# Patient Record
Sex: MALE | Race: WHITE | NOT HISPANIC OR LATINO | URBAN - METROPOLITAN AREA
[De-identification: names, ages, dates, MRNs, and addresses within clinical notes are randomized per-mention and may not be internally consistent; named-entity substitution may affect disease eponyms.]

---

## 2017-06-08 ENCOUNTER — RECORDS - HEALTHEAST (OUTPATIENT)
Dept: LAB | Facility: CLINIC | Age: 82
End: 2017-06-08

## 2017-06-09 LAB — HBA1C MFR BLD: 12.3 % (ref 4.2–6.1)

## 2018-01-11 ENCOUNTER — AMBULATORY - HEALTHEAST (OUTPATIENT)
Dept: GERIATRICS | Facility: CLINIC | Age: 83
End: 2018-01-11

## 2018-01-11 ENCOUNTER — AMBULATORY - HEALTHEAST (OUTPATIENT)
Dept: ADMINISTRATIVE | Facility: CLINIC | Age: 83
End: 2018-01-11

## 2018-01-11 RX ORDER — ATORVASTATIN CALCIUM 40 MG/1
40 TABLET, FILM COATED ORAL AT BEDTIME
Status: SHIPPED | COMMUNITY
Start: 2018-01-11

## 2018-01-11 RX ORDER — PANTOPRAZOLE SODIUM 40 MG/1
40 TABLET, DELAYED RELEASE ORAL 2 TIMES DAILY
Status: SHIPPED | COMMUNITY
Start: 2018-01-11

## 2018-01-11 RX ORDER — SIMETHICONE 80 MG
80 TABLET,CHEWABLE ORAL 3 TIMES DAILY
Status: SHIPPED | COMMUNITY
Start: 2018-01-11

## 2018-01-11 RX ORDER — DEXTROMETHORPHAN HBR. AND GUAIFENESIN 10; 100 MG/5ML; MG/5ML
10 SOLUTION ORAL EVERY 4 HOURS PRN
Status: SHIPPED | COMMUNITY
Start: 2018-01-11

## 2018-01-11 RX ORDER — ACETAMINOPHEN 500 MG
1000 TABLET ORAL EVERY 6 HOURS PRN
Status: SHIPPED | COMMUNITY
Start: 2018-01-11

## 2018-01-11 RX ORDER — LISINOPRIL 2.5 MG/1
2.5 TABLET ORAL DAILY
Status: SHIPPED | COMMUNITY
Start: 2018-01-11

## 2018-01-11 RX ORDER — NITROGLYCERIN 0.4 MG/1
0.4 TABLET SUBLINGUAL EVERY 5 MIN PRN
Status: SHIPPED | COMMUNITY
Start: 2018-01-11

## 2018-01-11 RX ORDER — ASPIRIN 325 MG
325 TABLET ORAL DAILY
Status: SHIPPED | COMMUNITY
Start: 2018-01-11

## 2018-01-11 RX ORDER — ISOSORBIDE MONONITRATE 30 MG/1
45 TABLET, EXTENDED RELEASE ORAL DAILY
Status: SHIPPED | COMMUNITY
Start: 2018-01-11

## 2018-01-11 RX ORDER — POTASSIUM CHLORIDE 1500 MG/1
20 TABLET, EXTENDED RELEASE ORAL DAILY
Status: SHIPPED | COMMUNITY
Start: 2018-01-11

## 2018-01-11 RX ORDER — BUMETANIDE 2 MG/1
2 TABLET ORAL
Status: SHIPPED | COMMUNITY
Start: 2018-01-11

## 2018-01-11 RX ORDER — LANOLIN ALCOHOL/MO/W.PET/CERES
3 CREAM (GRAM) TOPICAL
Status: SHIPPED | COMMUNITY
Start: 2018-01-11

## 2018-01-15 ENCOUNTER — OFFICE VISIT - HEALTHEAST (OUTPATIENT)
Dept: GERIATRICS | Facility: CLINIC | Age: 83
End: 2018-01-15

## 2018-01-15 DIAGNOSIS — E11.9 DIABETES MELLITUS (H): ICD-10-CM

## 2018-01-15 DIAGNOSIS — F03.90 DEMENTIA (H): ICD-10-CM

## 2018-01-15 DIAGNOSIS — J96.00 ACUTE RESPIRATORY FAILURE (H): ICD-10-CM

## 2018-01-15 DIAGNOSIS — J10.1 INFLUENZA A: ICD-10-CM

## 2018-01-15 DIAGNOSIS — I25.10 CORONARY ARTERY DISEASE: ICD-10-CM

## 2018-01-16 ENCOUNTER — OFFICE VISIT - HEALTHEAST (OUTPATIENT)
Dept: GERIATRICS | Facility: CLINIC | Age: 83
End: 2018-01-16

## 2018-01-16 ENCOUNTER — RECORDS - HEALTHEAST (OUTPATIENT)
Dept: LAB | Facility: CLINIC | Age: 83
End: 2018-01-16

## 2018-01-16 DIAGNOSIS — J96.00 ACUTE RESPIRATORY FAILURE (H): ICD-10-CM

## 2018-01-16 DIAGNOSIS — J44.9 COPD (CHRONIC OBSTRUCTIVE PULMONARY DISEASE) (H): ICD-10-CM

## 2018-01-16 DIAGNOSIS — E11.9 DIABETES MELLITUS (H): ICD-10-CM

## 2018-01-16 DIAGNOSIS — F03.90 DEMENTIA (H): ICD-10-CM

## 2018-01-16 DIAGNOSIS — E87.70 FLUID OVERLOAD: ICD-10-CM

## 2018-01-16 DIAGNOSIS — I50.9 CHF (CONGESTIVE HEART FAILURE) (H): ICD-10-CM

## 2018-01-16 DIAGNOSIS — J10.1 INFLUENZA A: ICD-10-CM

## 2018-01-16 DIAGNOSIS — I25.10 CORONARY ARTERY DISEASE: ICD-10-CM

## 2018-01-16 DIAGNOSIS — S81.811A LEG LACERATION, RIGHT, INITIAL ENCOUNTER: ICD-10-CM

## 2018-01-16 LAB
ANION GAP SERPL CALCULATED.3IONS-SCNC: 7 MMOL/L (ref 5–18)
BNP SERPL-MCNC: 114 PG/ML (ref 0–93)
BUN SERPL-MCNC: 18 MG/DL (ref 8–28)
CALCIUM SERPL-MCNC: 10 MG/DL (ref 8.5–10.5)
CHLORIDE BLD-SCNC: 102 MMOL/L (ref 98–107)
CO2 SERPL-SCNC: 30 MMOL/L (ref 22–31)
CREAT SERPL-MCNC: 1.4 MG/DL (ref 0.7–1.3)
ERYTHROCYTE [DISTWIDTH] IN BLOOD BY AUTOMATED COUNT: 14.5 % (ref 11–14.5)
GFR SERPL CREATININE-BSD FRML MDRD: 48 ML/MIN/1.73M2
GLUCOSE BLD-MCNC: 272 MG/DL (ref 70–125)
HCT VFR BLD AUTO: 33.3 % (ref 40–54)
HGB BLD-MCNC: 10.5 G/DL (ref 14–18)
MCH RBC QN AUTO: 30.2 PG (ref 27–34)
MCHC RBC AUTO-ENTMCNC: 31.5 G/DL (ref 32–36)
MCV RBC AUTO: 96 FL (ref 80–100)
PLATELET # BLD AUTO: 197 THOU/UL (ref 140–440)
PMV BLD AUTO: 10.3 FL (ref 8.5–12.5)
POTASSIUM BLD-SCNC: 4 MMOL/L (ref 3.5–5)
RBC # BLD AUTO: 3.48 MILL/UL (ref 4.4–6.2)
SODIUM SERPL-SCNC: 139 MMOL/L (ref 136–145)
WBC: 6.7 THOU/UL (ref 4–11)

## 2018-01-18 ENCOUNTER — RECORDS - HEALTHEAST (OUTPATIENT)
Dept: LAB | Facility: CLINIC | Age: 83
End: 2018-01-18

## 2018-01-18 ENCOUNTER — OFFICE VISIT - HEALTHEAST (OUTPATIENT)
Dept: GERIATRICS | Facility: CLINIC | Age: 83
End: 2018-01-18

## 2018-01-18 DIAGNOSIS — S81.811A LEG LACERATION, RIGHT, INITIAL ENCOUNTER: ICD-10-CM

## 2018-01-18 DIAGNOSIS — F03.90 DEMENTIA (H): ICD-10-CM

## 2018-01-18 DIAGNOSIS — E87.70 FLUID OVERLOAD: ICD-10-CM

## 2018-01-18 DIAGNOSIS — J10.1 INFLUENZA A: ICD-10-CM

## 2018-01-18 DIAGNOSIS — J44.9 COPD (CHRONIC OBSTRUCTIVE PULMONARY DISEASE) (H): ICD-10-CM

## 2018-01-18 LAB
ALBUMIN UR-MCNC: NEGATIVE MG/DL
ANION GAP SERPL CALCULATED.3IONS-SCNC: 10 MMOL/L (ref 5–18)
APPEARANCE UR: CLEAR
BACTERIA #/AREA URNS HPF: ABNORMAL HPF
BILIRUB UR QL STRIP: NEGATIVE
BNP SERPL-MCNC: 125 PG/ML (ref 0–93)
BUN SERPL-MCNC: 17 MG/DL (ref 8–28)
CALCIUM SERPL-MCNC: 10 MG/DL (ref 8.5–10.5)
CHLORIDE BLD-SCNC: 103 MMOL/L (ref 98–107)
CO2 SERPL-SCNC: 30 MMOL/L (ref 22–31)
COLOR UR AUTO: YELLOW
CREAT SERPL-MCNC: 1.28 MG/DL (ref 0.7–1.3)
ERYTHROCYTE [DISTWIDTH] IN BLOOD BY AUTOMATED COUNT: 14.3 % (ref 11–14.5)
GFR SERPL CREATININE-BSD FRML MDRD: 53 ML/MIN/1.73M2
GLUCOSE BLD-MCNC: 127 MG/DL (ref 70–125)
GLUCOSE UR STRIP-MCNC: NEGATIVE MG/DL
HCT VFR BLD AUTO: 33.3 % (ref 40–54)
HGB BLD-MCNC: 11.1 G/DL (ref 14–18)
HGB UR QL STRIP: NEGATIVE
HYALINE CASTS #/AREA URNS LPF: ABNORMAL LPF
KETONES UR STRIP-MCNC: NEGATIVE MG/DL
LEUKOCYTE ESTERASE UR QL STRIP: ABNORMAL
MCH RBC QN AUTO: 31.5 PG (ref 27–34)
MCHC RBC AUTO-ENTMCNC: 33.3 G/DL (ref 32–36)
MCV RBC AUTO: 95 FL (ref 80–100)
NITRATE UR QL: NEGATIVE
PH UR STRIP: 6.5 [PH] (ref 4.5–8)
PLATELET # BLD AUTO: 169 THOU/UL (ref 140–440)
PMV BLD AUTO: 10.2 FL (ref 8.5–12.5)
POTASSIUM BLD-SCNC: 3.6 MMOL/L (ref 3.5–5)
RBC # BLD AUTO: 3.52 MILL/UL (ref 4.4–6.2)
RBC #/AREA URNS AUTO: ABNORMAL HPF
SODIUM SERPL-SCNC: 143 MMOL/L (ref 136–145)
SP GR UR STRIP: 1.01 (ref 1–1.03)
SQUAMOUS #/AREA URNS AUTO: ABNORMAL LPF
UROBILINOGEN UR STRIP-ACNC: ABNORMAL
WBC #/AREA URNS AUTO: ABNORMAL HPF
WBC: 6.4 THOU/UL (ref 4–11)

## 2018-01-19 LAB — BACTERIA SPEC CULT: NO GROWTH

## 2018-01-23 ENCOUNTER — OFFICE VISIT - HEALTHEAST (OUTPATIENT)
Dept: GERIATRICS | Facility: CLINIC | Age: 83
End: 2018-01-23

## 2018-01-23 DIAGNOSIS — J10.1 INFLUENZA A: ICD-10-CM

## 2018-01-23 DIAGNOSIS — J44.9 COPD (CHRONIC OBSTRUCTIVE PULMONARY DISEASE) (H): ICD-10-CM

## 2018-01-23 DIAGNOSIS — I50.9 CHF (CONGESTIVE HEART FAILURE) (H): ICD-10-CM

## 2018-01-23 DIAGNOSIS — F03.90 DEMENTIA (H): ICD-10-CM

## 2018-01-23 DIAGNOSIS — E87.70 FLUID OVERLOAD: ICD-10-CM

## 2018-01-23 DIAGNOSIS — S81.811A LEG LACERATION, RIGHT, INITIAL ENCOUNTER: ICD-10-CM

## 2018-01-23 DIAGNOSIS — E11.9 DIABETES MELLITUS (H): ICD-10-CM

## 2018-01-24 ENCOUNTER — RECORDS - HEALTHEAST (OUTPATIENT)
Dept: LAB | Facility: CLINIC | Age: 83
End: 2018-01-24

## 2018-01-25 ENCOUNTER — RECORDS - HEALTHEAST (OUTPATIENT)
Dept: LAB | Facility: CLINIC | Age: 83
End: 2018-01-25

## 2018-01-25 ENCOUNTER — OFFICE VISIT - HEALTHEAST (OUTPATIENT)
Dept: GERIATRICS | Facility: CLINIC | Age: 83
End: 2018-01-25

## 2018-01-25 DIAGNOSIS — E11.9 DIABETES MELLITUS (H): ICD-10-CM

## 2018-01-25 DIAGNOSIS — I25.10 CORONARY ARTERY DISEASE: ICD-10-CM

## 2018-01-25 DIAGNOSIS — J44.9 COPD (CHRONIC OBSTRUCTIVE PULMONARY DISEASE) (H): ICD-10-CM

## 2018-01-25 DIAGNOSIS — F03.90 DEMENTIA (H): ICD-10-CM

## 2018-01-25 DIAGNOSIS — I50.9 CHF (CONGESTIVE HEART FAILURE) (H): ICD-10-CM

## 2018-01-25 DIAGNOSIS — J10.1 INFLUENZA A: ICD-10-CM

## 2018-01-25 DIAGNOSIS — I48.91 ATRIAL FIBRILLATION (H): ICD-10-CM

## 2018-01-25 LAB
ANION GAP SERPL CALCULATED.3IONS-SCNC: 6 MMOL/L (ref 5–18)
BNP SERPL-MCNC: 108 PG/ML (ref 0–93)
BUN SERPL-MCNC: 12 MG/DL (ref 8–28)
CALCIUM SERPL-MCNC: 10.2 MG/DL (ref 8.5–10.5)
CHLORIDE BLD-SCNC: 102 MMOL/L (ref 98–107)
CO2 SERPL-SCNC: 36 MMOL/L (ref 22–31)
CREAT SERPL-MCNC: 1.08 MG/DL (ref 0.7–1.3)
FLUAV AG SPEC QL IA: NORMAL
FLUBV AG SPEC QL IA: NORMAL
GFR SERPL CREATININE-BSD FRML MDRD: >60 ML/MIN/1.73M2
GLUCOSE BLD-MCNC: 125 MG/DL (ref 70–125)
POTASSIUM BLD-SCNC: 3.2 MMOL/L (ref 3.5–5)
SODIUM SERPL-SCNC: 144 MMOL/L (ref 136–145)

## 2018-01-30 ENCOUNTER — RECORDS - HEALTHEAST (OUTPATIENT)
Dept: LAB | Facility: CLINIC | Age: 83
End: 2018-01-30

## 2018-01-30 ENCOUNTER — OFFICE VISIT - HEALTHEAST (OUTPATIENT)
Dept: GERIATRICS | Facility: CLINIC | Age: 83
End: 2018-01-30

## 2018-01-30 DIAGNOSIS — J44.9 COPD (CHRONIC OBSTRUCTIVE PULMONARY DISEASE) (H): ICD-10-CM

## 2018-01-30 DIAGNOSIS — I50.9 CHF (CONGESTIVE HEART FAILURE) (H): ICD-10-CM

## 2018-01-30 DIAGNOSIS — I48.91 ATRIAL FIBRILLATION (H): ICD-10-CM

## 2018-01-30 DIAGNOSIS — F03.90 DEMENTIA (H): ICD-10-CM

## 2018-01-30 DIAGNOSIS — J10.1 INFLUENZA A: ICD-10-CM

## 2018-01-30 DIAGNOSIS — R53.83 FATIGUE: ICD-10-CM

## 2018-01-30 DIAGNOSIS — I25.10 CORONARY ARTERY DISEASE: ICD-10-CM

## 2018-01-30 DIAGNOSIS — E11.9 DIABETES MELLITUS (H): ICD-10-CM

## 2018-01-30 LAB
ANION GAP SERPL CALCULATED.3IONS-SCNC: 8 MMOL/L (ref 5–18)
BUN SERPL-MCNC: 16 MG/DL (ref 8–28)
CALCIUM SERPL-MCNC: 10.2 MG/DL (ref 8.5–10.5)
CHLORIDE BLD-SCNC: 101 MMOL/L (ref 98–107)
CO2 SERPL-SCNC: 32 MMOL/L (ref 22–31)
CREAT SERPL-MCNC: 1.03 MG/DL (ref 0.7–1.3)
ERYTHROCYTE [DISTWIDTH] IN BLOOD BY AUTOMATED COUNT: 14.8 % (ref 11–14.5)
GFR SERPL CREATININE-BSD FRML MDRD: >60 ML/MIN/1.73M2
GLUCOSE BLD-MCNC: 170 MG/DL (ref 70–125)
HCT VFR BLD AUTO: 35.4 % (ref 40–54)
HGB BLD-MCNC: 11.6 G/DL (ref 14–18)
MCH RBC QN AUTO: 30.7 PG (ref 27–34)
MCHC RBC AUTO-ENTMCNC: 32.8 G/DL (ref 32–36)
MCV RBC AUTO: 94 FL (ref 80–100)
PLATELET # BLD AUTO: 133 THOU/UL (ref 140–440)
PMV BLD AUTO: 10 FL (ref 8.5–12.5)
POTASSIUM BLD-SCNC: 3.4 MMOL/L (ref 3.5–5)
RBC # BLD AUTO: 3.78 MILL/UL (ref 4.4–6.2)
SODIUM SERPL-SCNC: 141 MMOL/L (ref 136–145)
WBC: 5.3 THOU/UL (ref 4–11)

## 2018-02-01 ENCOUNTER — OFFICE VISIT - HEALTHEAST (OUTPATIENT)
Dept: GERIATRICS | Facility: CLINIC | Age: 83
End: 2018-02-01

## 2018-02-01 DIAGNOSIS — E11.9 DIABETES MELLITUS (H): ICD-10-CM

## 2018-02-01 DIAGNOSIS — F03.918 DEMENTIA WITH BEHAVIORAL DISTURBANCE (H): ICD-10-CM

## 2018-02-01 DIAGNOSIS — I50.9 CHF (CONGESTIVE HEART FAILURE) (H): ICD-10-CM

## 2018-02-01 DIAGNOSIS — J44.9 COPD (CHRONIC OBSTRUCTIVE PULMONARY DISEASE) (H): ICD-10-CM

## 2018-02-01 DIAGNOSIS — J10.1 INFLUENZA A: ICD-10-CM

## 2018-02-01 DIAGNOSIS — R53.83 FATIGUE: ICD-10-CM

## 2018-02-01 DIAGNOSIS — I25.10 CORONARY ARTERY DISEASE: ICD-10-CM

## 2018-02-01 RX ORDER — FINASTERIDE 5 MG/1
5 TABLET, FILM COATED ORAL AT BEDTIME
Status: SHIPPED | COMMUNITY
Start: 2018-02-01

## 2018-02-01 RX ORDER — IPRATROPIUM BROMIDE AND ALBUTEROL SULFATE 2.5; .5 MG/3ML; MG/3ML
3 SOLUTION RESPIRATORY (INHALATION) 4 TIMES DAILY
Status: SHIPPED | COMMUNITY
Start: 2018-02-01

## 2018-02-01 RX ORDER — IPRATROPIUM BROMIDE AND ALBUTEROL SULFATE 2.5; .5 MG/3ML; MG/3ML
3 SOLUTION RESPIRATORY (INHALATION) EVERY 4 HOURS PRN
Status: SHIPPED | COMMUNITY
Start: 2018-02-01

## 2018-02-05 ENCOUNTER — AMBULATORY - HEALTHEAST (OUTPATIENT)
Dept: GERIATRICS | Facility: CLINIC | Age: 83
End: 2018-02-05

## 2018-02-09 ENCOUNTER — RECORDS - HEALTHEAST (OUTPATIENT)
Dept: LAB | Facility: CLINIC | Age: 83
End: 2018-02-09

## 2018-02-12 LAB
ALBUMIN SERPL-MCNC: 3 G/DL (ref 3.5–5)
ALP SERPL-CCNC: 53 U/L (ref 45–120)
ALT SERPL W P-5'-P-CCNC: 12 U/L (ref 0–45)
ANION GAP SERPL CALCULATED.3IONS-SCNC: 5 MMOL/L (ref 5–18)
AST SERPL W P-5'-P-CCNC: 12 U/L (ref 0–40)
BASOPHILS # BLD AUTO: 0 THOU/UL (ref 0–0.2)
BASOPHILS NFR BLD AUTO: 1 % (ref 0–2)
BILIRUB DIRECT SERPL-MCNC: 0.2 MG/DL
BILIRUB SERPL-MCNC: 0.7 MG/DL (ref 0–1)
BUN SERPL-MCNC: 10 MG/DL (ref 8–28)
CALCIUM SERPL-MCNC: 10.1 MG/DL (ref 8.5–10.5)
CHLORIDE BLD-SCNC: 100 MMOL/L (ref 98–107)
CHOLEST SERPL-MCNC: 114 MG/DL
CO2 SERPL-SCNC: 33 MMOL/L (ref 22–31)
CREAT SERPL-MCNC: 1.13 MG/DL (ref 0.7–1.3)
EOSINOPHIL # BLD AUTO: 0.1 THOU/UL (ref 0–0.4)
EOSINOPHIL NFR BLD AUTO: 2 % (ref 0–6)
ERYTHROCYTE [DISTWIDTH] IN BLOOD BY AUTOMATED COUNT: 13.6 % (ref 11–14.5)
FASTING STATUS PATIENT QL REPORTED: ABNORMAL
GFR SERPL CREATININE-BSD FRML MDRD: >60 ML/MIN/1.73M2
GLUCOSE BLD-MCNC: 262 MG/DL (ref 70–125)
HCT VFR BLD AUTO: 32.7 % (ref 40–54)
HDLC SERPL-MCNC: 27 MG/DL
HGB BLD-MCNC: 11.1 G/DL (ref 14–18)
LDLC SERPL CALC-MCNC: 37 MG/DL
LYMPHOCYTES # BLD AUTO: 0.8 THOU/UL (ref 0.8–4.4)
LYMPHOCYTES NFR BLD AUTO: 16 % (ref 20–40)
MCH RBC QN AUTO: 31.4 PG (ref 27–34)
MCHC RBC AUTO-ENTMCNC: 33.9 G/DL (ref 32–36)
MCV RBC AUTO: 93 FL (ref 80–100)
MONOCYTES # BLD AUTO: 0.4 THOU/UL (ref 0–0.9)
MONOCYTES NFR BLD AUTO: 7 % (ref 2–10)
NEUTROPHILS # BLD AUTO: 3.9 THOU/UL (ref 2–7.7)
NEUTROPHILS NFR BLD AUTO: 74 % (ref 50–70)
PLATELET # BLD AUTO: 150 THOU/UL (ref 140–440)
PMV BLD AUTO: 10.3 FL (ref 8.5–12.5)
POTASSIUM BLD-SCNC: 3.3 MMOL/L (ref 3.5–5)
PROT SERPL-MCNC: 6.2 G/DL (ref 6–8)
RBC # BLD AUTO: 3.53 MILL/UL (ref 4.4–6.2)
SODIUM SERPL-SCNC: 138 MMOL/L (ref 136–145)
TRIGL SERPL-MCNC: 252 MG/DL
TSH SERPL DL<=0.005 MIU/L-ACNC: 2.49 UIU/ML (ref 0.3–5)
WBC: 5.3 THOU/UL (ref 4–11)

## 2018-02-13 LAB — HBA1C MFR BLD: 7.2 % (ref 4.2–6.1)

## 2018-02-17 ENCOUNTER — RECORDS - HEALTHEAST (OUTPATIENT)
Dept: LAB | Facility: CLINIC | Age: 83
End: 2018-02-17

## 2018-02-19 LAB — POTASSIUM BLD-SCNC: 3.7 MMOL/L (ref 3.5–5)

## 2021-05-31 VITALS — WEIGHT: 226 LBS

## 2021-05-31 VITALS — WEIGHT: 215.4 LBS

## 2021-05-31 VITALS — WEIGHT: 231.4 LBS

## 2021-05-31 VITALS — WEIGHT: 206 LBS

## 2021-05-31 VITALS — WEIGHT: 230.6 LBS

## 2021-06-15 NOTE — PROGRESS NOTES
Code Status:  POLST AVAILABLE  Visit Type: Problem Visit     Facility:  [facility] [[id number]]         Facility Type: SNF (Skilled Nursing Facility, TCU)    History of Present Illness: Gadiel Woods is a 86 y.o. male who I am seeing today for follow-up on the TCU.  Patient recently admitted with influenza A.  He has completed his Tamiflu.  During hospitalization he had acute respiratory failure with possible aspiration pneumonia.  He was placed on BiPAP and was treated additionally with albuterol, antibiotics and steroids.  A video swallow did reveal evidence of esophageal dysmotility as well as chronic aspiration risk.  He does have underlying dementia.  Diabetes mellitus type 2.  He does continue on insulin.  May need additional adjustments to his insulin.  Denies any shortness of breath on visit today.  However he does continue with increased lower extremity edema as well as crackles with audible rub.  He has 2-3+ lower extremity edema he has underlying congestive heart failure.  Continues on Bumex.  Will only have 2 weights on him since he has been at the TCU.  Sats are on the low side at 90%.  He does have underlying COPD.  He continues in therapy.      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      Asthma      CAD (coronary artery disease)      CHF (congestive heart failure)      Chronic respiratory failure with hypoxia      COPD (chronic obstructive pulmonary disease)      Coronary atherosclerosis      Dementia      Essential hypertension      HLD (hyperlipidemia)      Hypertensive encephalopathy      Left leg DVT 11/05/2014     Morbid obesity      NSTEMI (non-ST elevated myocardial infarction) 08/13/2017     Pulmonary embolism on right 11/05/2014     Sleep  apnea      Tobacco use disorder      Type 2 diabetes mellitus        Current Outpatient Prescriptions   Medication Sig     acetaminophen (TYLENOL) 500 MG tablet Take 1,000 mg by mouth every 6 (six) hours as needed for pain.     albuterol (PROVENTIL) 2.5 mg /3 mL (0.083 %) nebulizer solution Take 2.5 mg by nebulization 4 (four) times a day.     albuterol (PROVENTIL) 2.5 mg /3 mL (0.083 %) nebulizer solution Take 2.5 mg by nebulization every 4 (four) hours as needed for wheezing.     aspirin 325 MG tablet Take 325 mg by mouth daily.     atorvastatin (LIPITOR) 40 MG tablet Take 40 mg by mouth at bedtime.     bumetanide (BUMEX) 2 MG tablet Take 2 mg by mouth daily before breakfast.     cholecalciferol, vitamin D3, 1,000 unit tablet Take 1,000 Units by mouth daily.     dextromethorphan-guaiFENesin (ROBITUSSIN-DM)  mg/5 mL liquid Take 10 mL by mouth every 4 (four) hours as needed.     insulin aspart (NOVOLOG) 100 unit/mL injection Inject under the skin 2 (two) times a day.  none 200-249 6 units 250-299 9 units 300-349 12 units 350-400 15 units > 400 call MD     insulin detemir (LEVEMIR) 100 unit/mL injection Inject 10 Units under the skin at bedtime.     insulin detemir (LEVEMIR) 100 unit/mL injection Inject 40 Units under the skin daily before breakfast.     isosorbide mononitrate (IMDUR) 30 MG 24 hr tablet Take 45 mg by mouth daily.     lisinopril (PRINIVIL,ZESTRIL) 2.5 MG tablet Take 2.5 mg by mouth daily.     melatonin 3 mg Tab tablet Take 3 mg by mouth at bedtime as needed.     nitroglycerin (NITROSTAT) 0.4 MG SL tablet Place 0.4 mg under the tongue every 5 (five) minutes as needed for chest pain.     pantoprazole (PROTONIX) 40 MG tablet Take 40 mg by mouth 2 (two) times a day.     potassium chloride SA (K-DUR,KLOR-CON) 20 MEQ tablet Take 20 mEq by mouth daily.     simethicone (MYLICON) 80 MG chewable tablet Chew 80 mg 3 (three) times a day.     terazosin (HYTRIN) 5 MG capsule Take 5 mg by mouth at  bedtime.       Allergies   Allergen Reactions     Rofecoxib          Review of Systems   No fevers or chills. No headache, lightheadedness or dizziness.  Patient denies SOB, as well as cough, no chest pains or palpitations. Appetite is good. No nausea, vomiting, constipation or diarrhea. No dysuria, frequency, burning or pain with urination.  Some underlying dementia unsure of accuracy of questioning.      Physical Exam   PHYSICAL EXAMINATION:  Vital signs:   Vitals:    01/16/18 2107   BP: 112/66   Pulse: 60   Resp: 16   Temp: 97.2  F (36.2  C)   SpO2: 90%     General: Awake, Alert, oriented x3, appropriately, follows simple commands, conversant  HEENT:PERRLA, Pink conjunctiva, anicteric sclerae, moist oral mucosa  NECK: Supple, without any lymphadenopathy, or masses  CVS:  S1  S2, with gallop.  Rub present.  LUNG: Crackles in bilateral lower lobes.  BACK: No kyphosis of the thoracic spine  ABDOMEN: Soft, obese, nontender to palpation, with positive bowel sounds  EXTREMITIES: Moves both upper and lower extremities with generalized weakness, 2-3+ pedal edema, no calf tenderness  SKIN: Warm and dry, no rashes or erythema noted  NEUROLOGIC: Intact, pulses palpable  PSYCHIATRIC: Cognitive impairment noted.          Labs:    Lab Results   Component Value Date    WBC 6.7 01/16/2018    HGB 10.5 (L) 01/16/2018    HCT 33.3 (L) 01/16/2018    MCV 96 01/16/2018     01/16/2018     Results for orders placed or performed in visit on 01/16/18   Basic Metabolic Panel   Result Value Ref Range    Sodium 139 136 - 145 mmol/L    Potassium 4.0 3.5 - 5.0 mmol/L    Chloride 102 98 - 107 mmol/L    CO2 30 22 - 31 mmol/L    Anion Gap, Calculation 7 5 - 18 mmol/L    Glucose 272 (H) 70 - 125 mg/dL    Calcium 10.0 8.5 - 10.5 mg/dL    BUN 18 8 - 28 mg/dL    Creatinine 1.40 (H) 0.70 - 1.30 mg/dL    GFR MDRD Af Amer 58 (L) >60 mL/min/1.73m2    GFR MDRD Non Af Amer 48 (L) >60 mL/min/1.73m2           Assessment/Plan:  1. Influenza A     2.  Acute respiratory failure     3. Fluid overload     4. Leg laceration, right, initial encounter     5. Dementia     6. Diabetes mellitus     7. Coronary artery disease     8. COPD (chronic obstructive pulmonary disease)     9. CHF (congestive heart failure)       Status post influenza A with respiratory failure secondary to aspiration and chronic COPD.  He has been weaned off his oxygen.  No longer on antibiotics.  His complete his Tamiflu.  He does have crackles in bilateral lower lobes with gallop and rub present.  Has a history of congestive heart failure.  Continues on Bumex.  We will increase his Bumex to 2 mg twice daily ×3 days then resume 2 mg daily.  Creatinine is 1.40 may experience worsening of his kidney function.  Will need follow-up laboratory.  Laceration to right lower extremity with greater than 85% thin yellow slough.  He is receiving Santyl topically.  Will continue.  No signs or symptoms of infection.  Dementia.  Diabetes.  He does continue on insulin.  May need adjustments.  Afternoon blood sugars occasionally elevated.  Coronary artery disease.  Denies any chest pain.        60 minutes spent of which greater than 50% was face to face communication with the patient about above plan of care    Electronically signed by: Carolin Petty, WALTER

## 2021-06-15 NOTE — PROGRESS NOTES
Code Status:  POLST AVAILABLE  Visit Type: Problem Visit     Facility:  [facility] [[id number]]         Facility Type: SNF (Skilled Nursing Facility, TCU)    History of Present Illness: Gadiel Woods is a 86 y.o. male who I am seeing today for follow-up on the TCU. Patient recently admitted with influenza A.  He has completed his Tamiflu.  During hospitalization he had acute respiratory failure with possible aspiration pneumonia.  He was placed on BiPAP and was treated additionally with albuterol, antibiotics and steroids.  A video swallow did reveal evidence of esophageal dysmotility as well as chronic aspiration risk.  He does have underlying dementia.  Diabetes mellitus type 2 on insulin. Denies any shortness of breath on visit today. Nursing staff report occasional cough.  Underlying congestive heart failure. Weights stable. He does have underlying COPD.  Patient with recent increased sexual inappropriateness. He has underlying dementia and was unaware of his behaviors. I did start him on Zoloft. Today he is lying in bed, reports not feeling well. He returned from therapy with hypoxia, with sats in the low 80's. He does not have a cough. He is a-febrile. He denies any CP.       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     Allergies   Allergen Reactions     Rofecoxib          Review of Systems   No fevers or chills. No headache, lightheadedness or dizziness.  Patient denies SOB, as well as cough, no chest pains or palpitations. Appetite is good. No nausea, vomiting, constipation or diarrhea. No dysuria, frequency, burning or pain with urination.  Underlying dementia. Reports \"not feeling well.\" Fatigued.     Physical Exam   PHYSICAL EXAMINATION:  Vital signs:   Vitals:    01/29/18 0922   BP: 169/71   Pulse: 64   Resp: 16   Temp: 98.3  F (36.8  C)   SpO2: 90%     General: Awake, Alert, oriented x2 lying in bed.   HEENT:PERRLA, Pink conjunctiva, anicteric sclerae, moist oral mucosa  NECK: Supple, without any lymphadenopathy, or masses  CVS:  S1  S2, with gallop.  Rub present.  LUNG: No wheezes rales or rhonchi.  No cough.  BACK: No kyphosis of the thoracic spine  ABDOMEN: Soft, obese, nontender to palpation, with positive bowel sounds  EXTREMITIES: Moves both upper and lower extremities with generalized weakness, 1+ pedal edema, no calf tenderness  SKIN: Large laceration to the right lower extremity scabbed.   NEUROLOGIC: Intact, pulses palpable  PSYCHIATRIC: Cognitive impairment noted.            Labs:    Lab Results   Component Value Date    WBC 7.0 01/25/2018    HGB 11.9 (L) 01/25/2018    HCT 34.9 (L) 01/25/2018    MCV 91 01/25/2018     01/25/2018     Results for orders placed or performed in visit on 01/25/18   Basic Metabolic Panel   Result Value Ref Range    Sodium 144 136 - 145 mmol/L    Potassium 3.2 (L) 3.5 - 5.0 mmol/L    Chloride 102 98 - 107 mmol/L    CO2 36 (H) 22 - 31 mmol/L    Anion Gap, Calculation 6 5 - 18 mmol/L    Glucose 125 70 - 125 mg/dL    Calcium 10.2 8.5 - 10.5 mg/dL    BUN 12 8 - 28 mg/dL    Creatinine 1.08 0.70 - 1.30 mg/dL    GFR MDRD Af Amer >60 >60 mL/min/1.73m2    GFR " MDRD Non Af Amer >60 >60 mL/min/1.73m2           Assessment/Plan:  1. Influenza A     2. COPD (chronic obstructive pulmonary disease)     3. CHF (congestive heart failure)     4. Dementia     5. Diabetes mellitus     6. Coronary artery disease     7. Atrial fibrillation           Patient recently treated for influenza A.  He developed some fluid overload during his TCU stay and was diuresed.  COPD.  Dementia.  Recently started on Zoloft for some sexual inappropriateness.  This appears to have improved.  Today was therapy and became hypoxic with sats in the 80s.  With rest this did improve.  He does have underlying coronary artery disease.  Lung sounds are clear.  1+ lower extremity edema improved from previously.  Will obtain labs, chest x-ray and EKG.  EKG showed atrial fib.  No history of atrial fib.  Currently on no beta-blockers for rate control drugs.  I did consult Dr. Iyer.  We will send to ER for evaluation.   .        Electronically signed by: Carolin Petty, WALTER

## 2021-06-15 NOTE — PROGRESS NOTES
[facility]      Facility:    [facility] [[id number]]  Code Status: UNKNOWN      Chief Complaint/Reason for Visit:  Chief Complaint   Patient presents with     H & P     Acute respiratory failure, influenza A infection, COPD exacerbation, dementia, coronary artery disease, diabetes, history of pulmonary embolism with deep vein thrombosis.       HPI:   Gadiel is a 86 y.o. male who was recently admitted to the hospital on 1/6/2018.  He came in with signs and symptoms of cough influenza-like symptoms with shortness of breath.  He was positive for influenza A was treated appropriately with Tamiflu.  He did have acute respiratory failure and possible aspiration.  He was admitted to the hospital and put on BiPAP into the intensive care unit and he underwent treatment with albuterol Tamiflu and steroids.  He also had antibiotics and did have some signs of aspiration and video swallow study did reveal evidence of esophageal dysmotility as well as chronic aspiration risk.  He was treated appropriately and did improve and was transferred here to the TCU at [place] in stable condition.    Since he has been here his blood sugars have been running anywhere from 90 up to 357 and they were poorly controlled in the hospital.  Patient does have dementia however he does answer questions appropriately.  He says he feels fine without any pain chest pain or shortness of breath.  He does not know if he is back to his baseline function at this time.    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      Asthma      CAD (coronary artery disease)      CHF (congestive heart failure)      Chronic respiratory failure with hypoxia      COPD (chronic obstructive pulmonary disease)      Coronary atherosclerosis      Dementia      Essential hypertension      HLD (hyperlipidemia)      Hypertensive  encephalopathy      Left leg DVT 11/05/2014     Morbid obesity      NSTEMI (non-ST elevated myocardial infarction) 08/13/2017     Pulmonary embolism on right 11/05/2014     Sleep apnea      Tobacco use disorder      Type 2 diabetes mellitus            Surgical History:  Past Surgical History:   Procedure Laterality Date     APPENDECTOMY       CARDIAC CATHETERIZATION  02/17/2014     CORONARY STENT PLACEMENT  05/2007    PCI-LAD & RCA     ESOPHAGOGASTRODUODENOSCOPY      CHEST PAIN     KNEE ARTHROSCOPY Left        Family History:   Family History   Problem Relation Age of Onset     Cancer Other        Social History:    Social History     Social History     Marital status:      Spouse name: N/A     Number of children: N/A     Years of education: N/A     Social History Main Topics     Smoking status: Unknown If Ever Smoked     Smokeless tobacco: None     Alcohol use No     Drug use: None     Sexual activity: Not Asked     Other Topics Concern     None     Social History Narrative          Review of Systems   Constitutional:        Patient denies any pain fevers chills nausea vomiting diarrhea change in vision hearing taste or smell weakness one side of the chest pain or shortness of breath.  He does have a cough occasionally at this time he is urinating without difficulty and there is no urgency frequency or burning with urination and is no polyphagia or polydipsia polyuria.  He is moving his bowels without difficulty and the remainder of the review of systems is negative.  Review of systems is in question secondary to his cognitive impairment.       Vitals:    01/15/18 0906   BP: 132/79   Pulse: 86   Resp: 24   Temp: 97  F (36.1  C)   SpO2: 91%       Physical Exam   Constitutional: He appears well-nourished. No distress.   HENT:   Head: Normocephalic and atraumatic.   Nose: Nose normal.   Mouth/Throat: No oropharyngeal exudate.   Eyes: Right eye exhibits no discharge. Left eye exhibits no discharge. No scleral  icterus.   Neck: Neck supple. No thyromegaly present.   Cardiovascular: Normal rate and regular rhythm.    Pulmonary/Chest:   Patient is in no apparent respiratory distress.  However he does have crackles at the bases of his lungs bilateral.  And a slight end expiratory wheeze.  He does have a decreased inspiratory volume but his respiratory rate in his O2 sats are within normal limits at this time.   Abdominal: Soft. Bowel sounds are normal. He exhibits distension. There is no tenderness. There is no rebound.   Musculoskeletal: He exhibits edema. He exhibits no tenderness.   Neurological: He is alert. No cranial nerve deficit. He exhibits normal muscle tone.   She is disoriented to time and place at this time.   Skin: Skin is warm and dry. He is not diaphoretic. No erythema.   Psychiatric: He has a normal mood and affect. His behavior is normal.       Medication List:  Current Outpatient Prescriptions   Medication Sig     acetaminophen (TYLENOL) 500 MG tablet Take 1,000 mg by mouth every 6 (six) hours as needed for pain.     albuterol (PROVENTIL) 2.5 mg /3 mL (0.083 %) nebulizer solution Take 2.5 mg by nebulization 4 (four) times a day.     albuterol (PROVENTIL) 2.5 mg /3 mL (0.083 %) nebulizer solution Take 2.5 mg by nebulization every 4 (four) hours as needed for wheezing.     aspirin 325 MG tablet Take 325 mg by mouth daily.     atorvastatin (LIPITOR) 40 MG tablet Take 40 mg by mouth at bedtime.     bumetanide (BUMEX) 2 MG tablet Take 2 mg by mouth daily before breakfast.     cholecalciferol, vitamin D3, 1,000 unit tablet Take 1,000 Units by mouth daily.     dextromethorphan-guaiFENesin (ROBITUSSIN-DM)  mg/5 mL liquid Take 10 mL by mouth every 4 (four) hours as needed.     insulin aspart (NOVOLOG) 100 unit/mL injection Inject under the skin 2 (two) times a day.  none 200-249 6 units 250-299 9 units 300-349 12 units 350-400 15 units > 400 call MD     insulin detemir (LEVEMIR) 100 unit/mL injection  Inject 10 Units under the skin at bedtime.     insulin detemir (LEVEMIR) 100 unit/mL injection Inject 40 Units under the skin daily before breakfast.     isosorbide mononitrate (IMDUR) 30 MG 24 hr tablet Take 45 mg by mouth daily.     lisinopril (PRINIVIL,ZESTRIL) 2.5 MG tablet Take 2.5 mg by mouth daily.     melatonin 3 mg Tab tablet Take 3 mg by mouth at bedtime as needed.     nitroglycerin (NITROSTAT) 0.4 MG SL tablet Place 0.4 mg under the tongue every 5 (five) minutes as needed for chest pain.     pantoprazole (PROTONIX) 40 MG tablet Take 40 mg by mouth 2 (two) times a day.     potassium chloride SA (K-DUR,KLOR-CON) 20 MEQ tablet Take 20 mEq by mouth daily.     simethicone (MYLICON) 80 MG chewable tablet Chew 80 mg 3 (three) times a day.     terazosin (HYTRIN) 5 MG capsule Take 5 mg by mouth at bedtime.       Labs: Blood sugars are running anywhere from 90 up to 357.  Hospital labs are not present at this time however influenza A was positive.      Assessment:    ICD-10-CM    1. Influenza A J10.1    2. Acute respiratory failure J96.00    3. Dementia F03.90    4. Coronary artery disease I25.10    5. Diabetes mellitus E11.9        Plan: Plan at this time speech therapy will evaluate and treat blood sugars to be done 4 times daily.  White count will be done tomorrow secondary to influenza and basic metabolic profile be done secondary to diuresis.  Dietitian will also see for consistent carbohydrate diet and I will continue to monitor above medical problems.  Coronary disease seems to be relatively stable at this time his dementia appears to be baseline from what I understand from reading in the notes.  He is not in any acute respiratory failure and he was treated appropriately for influenza A.        Electronically signed by: Shadi Lundberg DO

## 2021-06-15 NOTE — PROGRESS NOTES
"Code Status:  POLST AVAILABLE  Visit Type: Discharge Summary and H & P     Facility:  [facility] [[id number]]         Facility Type: SNF (Skilled Nursing Facility, TCU)    History of Present Illness: Gadiel Woods is a 86 y.o. male who I am seeing today for discharge from the TCU. Patient recently admitted with influenza A.  He has completed his Tamiflu.  During hospitalization he had acute respiratory failure with possible aspiration pneumonia.  He was placed on BiPAP and was treated additionally with albuterol, antibiotics and steroids.  A video swallow did reveal evidence of esophageal dysmotility as well as chronic aspiration risk.  He does have underlying dementia.  Diabetes mellitus type 2 on insulin. I have made several adjustments to his insulin during his TCU stay.  Denies any shortness of breath on visit today. Nursing staff report occasional cough.  Underlying congestive heart failure. Weights stable.No CP or SOB. During his TCU stay he did have a run of A-fib with complaints of tiredness. No previous history. He is not on a beta blocker. He was sent to ER for workup however work up was negative so her returned to the TCU. I did repeat EKG which was normal.  He does have underlying COPD.  Patient with recent increased sexual inappropriateness. He has underlying dementia and was unaware of his behaviors. I did start him on Zoloft as well as change his Hytrin to Finasteride. He is no longer sexually inappropriate however he has has some vague complaints of \"not feeling well\" including fatigue, GI upset and headache. I have held his Zoloft x 2 days. Recent laboratory unremarkable and no physical symptoms to suggest acute illness. He does have a hx of GERDS and gas. He continues on Simethicone daily as well as Protonix.      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     Allergies   Allergen Reactions     Rofecoxib          Review of Systems   Pt a poor historian however reports: No fevers or chills. No headache, lightheadedness or dizziness.  Patient denies SOB, as well as cough, no chest pains or palpitations. Appetite is good. No nausea, vomiting, constipation or diarrhea.Chronic gas on Simethicone.  No dysuria, frequency, burning or pain with urination.  Underlying dementia. Vague complaints of \"not feeling well.\"     Physical Exam   PHYSICAL EXAMINATION:  Vital signs:   Vitals:    02/01/18 1502   BP: 115/68   Pulse: 66   Resp: 20   Temp: 98.2  F (36.8  C)   SpO2: 94%     General: Awake, Alert, sitting up in wheelchair.    HEENT:PERRLA, Pink conjunctiva, anicteric sclerae, moist oral mucosa  NECK: Supple, without any lymphadenopathy, or masses  CVS: S1, S2 without murmur or gallop.   LUNG: No wheezes rales or rhonchi.  No cough.  BACK: No kyphosis of the thoracic spine  ABDOMEN: Soft, obese, round, nontender to palpation, with positive bowel sounds. He is continuously belching on exam.   EXTREMITIES: Moves both upper and lower extremities with generalized weakness, trace pedal edema, no calf tenderness  SKIN: Dry and intact.   NEUROLOGIC: Intact, pulses palpable  PSYCHIATRIC: Cognitive impairment " "noted. Very flat affect.           Labs:    Lab Results   Component Value Date    WBC 5.3 01/30/2018    HGB 11.6 (L) 01/30/2018    HCT 35.4 (L) 01/30/2018    MCV 94 01/30/2018     (L) 01/30/2018     Results for orders placed or performed in visit on 01/30/18   Basic Metabolic Panel   Result Value Ref Range    Sodium 141 136 - 145 mmol/L    Potassium 3.4 (L) 3.5 - 5.0 mmol/L    Chloride 101 98 - 107 mmol/L    CO2 32 (H) 22 - 31 mmol/L    Anion Gap, Calculation 8 5 - 18 mmol/L    Glucose 170 (H) 70 - 125 mg/dL    Calcium 10.2 8.5 - 10.5 mg/dL    BUN 16 8 - 28 mg/dL    Creatinine 1.03 0.70 - 1.30 mg/dL    GFR MDRD Af Amer >60 >60 mL/min/1.73m2    GFR MDRD Non Af Amer >60 >60 mL/min/1.73m2           Assessment/Plan:  1. Influenza A     2. COPD (chronic obstructive pulmonary disease)     3. CHF (congestive heart failure)     4. Fatigue     5. Dementia with behavioral disturbance     6. Coronary artery disease     7. Diabetes mellitus       Recent hospitalization for Influenza A with underlying COPD and CHF. Pt was treated with additional diuretics during his TCU for fluid overload. BNP trending downward. He continues on Bumex. Weights stable. No SOB or CP. He has underlying Dementia with inappropriate sexual behaviors. He was trialed on Zoloft and his Hytrin was changed to Finasteride. He is voiding adequately. He has continued to complain of \"tiredness, upset stomach and headache\". I held his Zoloft x 2 days. I will discontinue at this time. DM on Levimir. I have made changes during his TCU. Goal would be to get him off SS. COPD. He continues with nebs. Lungs are CTA.     Pt will d/c to LTC with current meds and treatments. Pt will need follow up with PCP in 7-10 days.     Total time spent for this visit was 35 minutes with >50% of time spent in counseling and coordination of care including reviewing labs and meds.     Electronically signed by: Carolin Petty CNP     "

## 2021-06-15 NOTE — PROGRESS NOTES
Code Status:  POLST AVAILABLE  Visit Type: Problem Visit     Facility:  [facility] [[id number]]         Facility Type: SNF (Skilled Nursing Facility, TCU)    History of Present Illness: Gadiel Woods is a 86 y.o. male who I am seeing today for follow-up on the TCU.  Patient recently admitted with influenza A.  He has completed his Tamiflu.  During hospitalization he had acute respiratory failure with possible aspiration pneumonia.  He was placed on BiPAP and was treated additionally with albuterol, antibiotics and steroids.  A video swallow did reveal evidence of esophageal dysmotility as well as chronic aspiration risk.  He does have underlying dementia.  Diabetes mellitus type 2.  He does continue on insulin. Denies any shortness of breath on visit today. Underlying congestive heart failure.  Edema improved from last visit.  Weight down 3 pounds.  No wheezing.  No rhonchi or rub noted. He does have underlying COPD.  Patient with increased sexual inappropriateness.  Patient attempting to make advances at the nurses as well as expose himself to staff and other residents.  He is currently on no psychotropics, antidepressants and for medications for his dementia.  He was living in the assisted living previously.  He is unaware of the inappropriateness of these behavior secondary to his cognitive impairment.  He continues in therapy.      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      Asthma      CAD (coronary artery disease)      CHF (congestive heart failure)      Chronic respiratory failure with hypoxia      COPD (chronic obstructive pulmonary disease)      Coronary atherosclerosis      Dementia      Essential hypertension      HLD (hyperlipidemia)       Hypertensive encephalopathy      Left leg DVT 11/05/2014     Morbid obesity      NSTEMI (non-ST elevated myocardial infarction) 08/13/2017     Pulmonary embolism on right 11/05/2014     Sleep apnea      Tobacco use disorder      Type 2 diabetes mellitus        Current Outpatient Prescriptions   Medication Sig     acetaminophen (TYLENOL) 500 MG tablet Take 1,000 mg by mouth every 6 (six) hours as needed for pain.     albuterol (PROVENTIL) 2.5 mg /3 mL (0.083 %) nebulizer solution Take 2.5 mg by nebulization 4 (four) times a day.     albuterol (PROVENTIL) 2.5 mg /3 mL (0.083 %) nebulizer solution Take 2.5 mg by nebulization every 4 (four) hours as needed for wheezing.     aspirin 325 MG tablet Take 325 mg by mouth daily.     atorvastatin (LIPITOR) 40 MG tablet Take 40 mg by mouth at bedtime.     bumetanide (BUMEX) 2 MG tablet Take 2 mg by mouth daily before breakfast.     cholecalciferol, vitamin D3, 1,000 unit tablet Take 1,000 Units by mouth daily.     dextromethorphan-guaiFENesin (ROBITUSSIN-DM)  mg/5 mL liquid Take 10 mL by mouth every 4 (four) hours as needed.     insulin aspart (NOVOLOG) 100 unit/mL injection Inject under the skin 2 (two) times a day.  none 200-249 6 units 250-299 9 units 300-349 12 units 350-400 15 units > 400 call MD     insulin detemir (LEVEMIR) 100 unit/mL injection Inject 10 Units under the skin at bedtime.     insulin detemir (LEVEMIR) 100 unit/mL injection Inject 40 Units under the skin daily before breakfast.     isosorbide mononitrate (IMDUR) 30 MG 24 hr tablet Take 45 mg by mouth daily.     lisinopril (PRINIVIL,ZESTRIL) 2.5 MG tablet Take 2.5 mg by mouth daily.     melatonin 3 mg Tab tablet Take 3 mg by mouth at bedtime as needed.     nitroglycerin (NITROSTAT) 0.4 MG SL tablet Place 0.4 mg under the tongue every 5 (five) minutes as needed for chest pain.     pantoprazole (PROTONIX) 40 MG tablet Take 40 mg by mouth 2 (two) times a day.     potassium chloride SA  "(K-DUR,KLOR-CON) 20 MEQ tablet Take 20 mEq by mouth daily.     simethicone (MYLICON) 80 MG chewable tablet Chew 80 mg 3 (three) times a day.     terazosin (HYTRIN) 5 MG capsule Take 5 mg by mouth at bedtime.       Allergies   Allergen Reactions     Rofecoxib          Review of Systems   No fevers or chills. No headache, lightheadedness or dizziness.  Patient denies SOB, as well as cough, no chest pains or palpitations. Appetite is good. No nausea, vomiting, constipation or diarrhea. No dysuria, frequency, burning or pain with urination.  Underlying dementia unsure of accuracy of questioning.  Talks inappropriately about sexual gestures.  Unaware of the inappropriateness of this behavior.      Physical Exam   PHYSICAL EXAMINATION:  Vital signs:   Vitals:    01/18/18 1916   BP: 123/74   Pulse: 61   Resp: 20   Temp: 97.8  F (36.6  C)   SpO2: 97%     General: Awake, Alert, oriented x2, standing in doorway\" unclothed from the waist down.  Making sexual advances toward nursing staff.  HEENT:PERRLA, Pink conjunctiva, anicteric sclerae, moist oral mucosa  NECK: Supple, without any lymphadenopathy, or masses  CVS:  S1  S2, with gallop.  Rub present.  LUNG: No wheezes rales or rhonchi.  No cough.  BACK: No kyphosis of the thoracic spine  ABDOMEN: Soft, obese, nontender to palpation, with positive bowel sounds  EXTREMITIES: Moves both upper and lower extremities with generalized weakness, 2+ pedal edema, no calf tenderness  SKIN: Large laceration to the right lower extremity.  Abrasion to the knee.  Minimal slough.  Moderate serosanguineous drainage.  No redness or warmth.  Continues with topical treatment.  NEUROLOGIC: Intact, pulses palpable  PSYCHIATRIC: Cognitive impairment noted.  He cannot tell me where he lived previously.  He thinks he lived at the nursing home.          Labs:    Lab Results   Component Value Date    WBC 6.4 01/18/2018    HGB 11.1 (L) 01/18/2018    HCT 33.3 (L) 01/18/2018    MCV 95 01/18/2018    PLT " 169 01/18/2018     Results for orders placed or performed in visit on 01/18/18   Basic Metabolic Panel   Result Value Ref Range    Sodium 143 136 - 145 mmol/L    Potassium 3.6 3.5 - 5.0 mmol/L    Chloride 103 98 - 107 mmol/L    CO2 30 22 - 31 mmol/L    Anion Gap, Calculation 10 5 - 18 mmol/L    Glucose 127 (H) 70 - 125 mg/dL    Calcium 10.0 8.5 - 10.5 mg/dL    BUN 17 8 - 28 mg/dL    Creatinine 1.28 0.70 - 1.30 mg/dL    GFR MDRD Af Amer >60 >60 mL/min/1.73m2    GFR MDRD Non Af Amer 53 (L) >60 mL/min/1.73m2           Assessment/Plan:  1. Influenza A     2. Leg laceration, right, initial encounter     3. Dementia     4. COPD (chronic obstructive pulmonary disease)     5. Fluid overload       Patient with recent influenza A with underlying COPD congestive heart failure.  I did increase his Bumex on the last visit secondary to some increased lower extremity edema and rhonchi.  Weights are down 3-4 pounds.  No rhonchi.  Lung sounds are clear.  No shortness of breath.  Laboratory pending.  He does have underlying dementia.  Presenting to staff with sexual inappropriateness.  Making sexual advancements toward staff.  Will check laboratory to rule out infection including UA UC.  He does continue on Proscar which can cause erection elongation.  Will change him from Proscar to finasteride in attempts to decrease libido.  Currently on no medications for his dementia and or sexual inappropriateness.  Would recommend starting Zoloft at at bedtime.  I did leave a voicemail for his daughter.      45 minutes spent of which greater than 50% was face to face communication with the patient about above plan of care    Electronically signed by: Carolin Petty, WALTER

## 2021-06-15 NOTE — PROGRESS NOTES
"Code Status:  POLST AVAILABLE  Visit Type: Problem Visit     Facility:  [facility] [[id number]]         Facility Type: SNF (Skilled Nursing Facility, TCU)    History of Present Illness: Gadiel Woods is a 86 y.o. male who I am seeing today for follow-up on the TCU. Patient recently admitted with influenza A.  He has completed his Tamiflu.  During hospitalization he had acute respiratory failure with possible aspiration pneumonia.  He was placed on BiPAP and was treated additionally with albuterol, antibiotics and steroids.  A video swallow did reveal evidence of esophageal dysmotility as well as chronic aspiration risk.  He does have underlying dementia.  Diabetes mellitus type 2 on insulin. Denies any shortness of breath on visit today. Nursing staff report occasional cough.  Underlying congestive heart failure. Weights stable. He does have underlying COPD.  Patient with recent increased sexual inappropriateness. He has underlying dementia and was unaware of his behaviors. I did start him on Zoloft. Pt reports feelings of \"tiredness And GI upset.\" Denies any nausea or vomiting. He denies any CP or palpations.       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     Allergies   Allergen Reactions     Rofecoxib          Review of Systems   Pt a poor historian however reports: No fevers or chills. No headache, lightheadedness or dizziness.  Patient denies SOB, as well as cough, no chest pains or palpitations. Appetite is good. No nausea, vomiting, constipation or diarrhea. No dysuria, frequency, burning or pain with urination.  Underlying dementia. Reports \"not feeling well.\"     Physical Exam   PHYSICAL EXAMINATION:  Vital signs:   Vitals:    01/30/18 0927   BP: 178/72   Pulse: 65   Resp: 18   Temp: 97.8  F (36.6  C)   SpO2: 91%     General: Awake, Alert, oriented x2 lying in bed on his side.   HEENT:PERRLA, Pink conjunctiva, anicteric sclerae, moist oral mucosa  NECK: Supple, without any lymphadenopathy, or masses  CVS:  Irregular rhythm.   LUNG: No wheezes rales or rhonchi.  No cough.  BACK: No kyphosis of the thoracic spine  ABDOMEN: Soft, obese, nontender to palpation, with positive bowel sounds  EXTREMITIES: Moves both upper and lower extremities with generalized weakness, trace pedal edema, no calf tenderness  SKIN: Dry and intact.   NEUROLOGIC: Intact, pulses palpable  PSYCHIATRIC: Cognitive impairment noted. Very flat affect.           Labs:    Lab Results   Component Value Date    WBC 5.3 01/30/2018    HGB 11.6 (L) 01/30/2018    HCT 35.4 (L) 01/30/2018    MCV 94 01/30/2018     (L) 01/30/2018     Results for orders placed or performed during the hospital encounter of 01/25/18   Basic Metabolic Panel   Result Value Ref Range    Sodium 144 136 - 145 mmol/L    Potassium 3.3 (L) 3.5 - 5.0 mmol/L    Chloride 100 98 - 107 mmol/L    CO2 34 (H) 22 - 31 mmol/L    Anion Gap, Calculation 10 5 - 18 mmol/L    Glucose 187 (H) 70 - 125 mg/dL    Calcium 10.2 8.5 - 10.5 mg/dL    BUN 15 8 - 28 mg/dL    Creatinine 1.15 0.70 - 1.30 mg/dL    GFR MDRD Af Amer >60 >60 mL/min/1.73m2    GFR MDRD Non Af Amer 60 (L) >60 " "mL/min/1.73m2           Assessment/Plan:  1. Influenza A     2. COPD (chronic obstructive pulmonary disease)     3. CHF (congestive heart failure)     4. Coronary artery disease     5. Atrial fibrillation     6. Dementia     7. Diabetes mellitus     8. Fatigue         Patient with recent influenza A with underlying COPD and CHF.  During his TCU stay I have treated him for fluid overload.  Weights are stable.  No lower extremity edema.  Denies any shortness of breath or chest pain.  However reports overall \"not feeling well\".  Later he complained of GI upset.  No nausea or vomiting.  Recently I did send him to the ER for evaluation of atrial fib.  Patient returned with no new orders.  I did note during hospitalization and a potassium of 3.2.  No supplement was given.  Today fatigued.  I will order CBC and BMP.  I will also repeat EKG.  Patient has a history of dementia somewhat of a poor historian.  He did have some sexual inappropriateness prior and was started recently on Zoloft.  I will hold this to see if symptoms improve.  .        Electronically signed by: Carolin Petty CNP     "

## 2021-06-15 NOTE — PROGRESS NOTES
Code Status:  POLST AVAILABLE  Visit Type: Problem Visit     Facility:  [facility] [[id number]]         Facility Type: SNF (Skilled Nursing Facility, TCU)    History of Present Illness: Gadiel Woods is a 86 y.o. male who I am seeing today for follow-up on the TCU. Patient recently admitted with influenza A.  He has completed his Tamiflu.  During hospitalization he had acute respiratory failure with possible aspiration pneumonia.  He was placed on BiPAP and was treated additionally with albuterol, antibiotics and steroids.  A video swallow did reveal evidence of esophageal dysmotility as well as chronic aspiration risk.  He does have underlying dementia.  Diabetes mellitus type 2 on insulin. Denies any shortness of breath on visit today. Nursing staff report occasional cough.  Underlying congestive heart failure.  Edema improved from last visit.  Weight now up 3 pounds. He does have underlying COPD.  Patient with recent increased sexual inappropriateness. He has underlying dementia and was unaware of his behaviors. I did start him on Zoloft. Today he is very calm.       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     Allergies   Allergen Reactions     Rofecoxib          Review of Systems   No fevers or chills. No headache, lightheadedness or dizziness.  Patient denies SOB, as well as cough, no chest pains or palpitations. Appetite is good. No nausea, vomiting, constipation or diarrhea. No dysuria, frequency, burning or pain with urination.  Underlying dementia.    Physical Exam   PHYSICAL EXAMINATION:  Vital signs:   Vitals:    01/23/18 2000   BP: 133/71   Pulse: 63   Resp: 16   Temp: 97.9  F (36.6  C)   SpO2: 92%     General: Awake, Alert, oriented x2 lying in bed.   HEENT:PERRLA, Pink conjunctiva, anicteric sclerae, moist oral mucosa  NECK: Supple, without any lymphadenopathy, or masses  CVS:  S1  S2, with gallop.  Rub present.  LUNG: No wheezes rales or rhonchi.  No cough.  BACK: No kyphosis of the thoracic spine  ABDOMEN: Soft, obese, nontender to palpation, with positive bowel sounds  EXTREMITIES: Moves both upper and lower extremities with generalized weakness, 2+ pedal edema, no calf tenderness  SKIN: Large laceration to the right lower extremity greatly improved with 100% granulation tissue.  Abrasion to the knee scabbed.    NEUROLOGIC: Intact, pulses palpable  PSYCHIATRIC: Cognitive impairment noted.            Labs:    Lab Results   Component Value Date    WBC 6.4 01/18/2018    HGB 11.1 (L) 01/18/2018    HCT 33.3 (L) 01/18/2018    MCV 95 01/18/2018     01/18/2018     Results for orders placed or performed in visit on 01/18/18   Basic Metabolic Panel   Result Value Ref Range    Sodium 143 136 - 145 mmol/L    Potassium 3.6 3.5 - 5.0 mmol/L    Chloride 103 98 - 107 mmol/L    CO2 30 22 - 31 mmol/L    Anion Gap, Calculation 10 5 - 18 mmol/L    Glucose 127 (H) 70 - 125 mg/dL    Calcium 10.0 8.5 - 10.5 mg/dL    BUN 17 8 - 28 mg/dL    Creatinine 1.28 0.70 - 1.30 mg/dL    GFR MDRD Af Amer >60 >60 mL/min/1.73m2    GFR MDRD Non Af Amer 53 (L) >60 mL/min/1.73m2           Assessment/Plan:  1. Influenza A     2.  COPD (chronic obstructive pulmonary disease)     3. Fluid overload     4. Dementia     5. CHF (congestive heart failure)     6. Diabetes mellitus     7. Leg laceration, right, initial encounter       Recent influenza. He has underlying COPD and CHF. Nursing staff report cough and increased weight gain. I will give him additional Bumex today. Continue with daily weights. Will follow up with BMP and BNP on Thursday. Hx of dementia with recent sexual inappropriateness. I did start him on Zoloft and changed his BPH medicine. Today he is very calm on visit.       Electronically signed by: Carolin Petty, CNP